# Patient Record
Sex: MALE | Race: WHITE | NOT HISPANIC OR LATINO | URBAN - METROPOLITAN AREA
[De-identification: names, ages, dates, MRNs, and addresses within clinical notes are randomized per-mention and may not be internally consistent; named-entity substitution may affect disease eponyms.]

---

## 2022-02-28 ENCOUNTER — EMERGENCY (EMERGENCY)
Facility: HOSPITAL | Age: 72
LOS: 0 days | Discharge: HOME | End: 2022-02-28
Admitting: EMERGENCY MEDICINE

## 2022-02-28 ENCOUNTER — INPATIENT (INPATIENT)
Facility: HOSPITAL | Age: 72
LOS: 0 days | Discharge: HOME | End: 2022-03-01
Attending: INTERNAL MEDICINE | Admitting: INTERNAL MEDICINE
Payer: MEDICARE

## 2022-02-28 VITALS
WEIGHT: 203.93 LBS | OXYGEN SATURATION: 100 % | HEIGHT: 70 IN | HEART RATE: 73 BPM | TEMPERATURE: 97 F | RESPIRATION RATE: 22 BRPM | DIASTOLIC BLOOD PRESSURE: 75 MMHG | SYSTOLIC BLOOD PRESSURE: 139 MMHG

## 2022-02-28 DIAGNOSIS — I48.91 UNSPECIFIED ATRIAL FIBRILLATION: ICD-10-CM

## 2022-02-28 DIAGNOSIS — Z20.822 CONTACT WITH AND (SUSPECTED) EXPOSURE TO COVID-19: ICD-10-CM

## 2022-02-28 DIAGNOSIS — R06.02 SHORTNESS OF BREATH: ICD-10-CM

## 2022-02-28 DIAGNOSIS — E78.5 HYPERLIPIDEMIA, UNSPECIFIED: ICD-10-CM

## 2022-02-28 DIAGNOSIS — R00.0 TACHYCARDIA, UNSPECIFIED: ICD-10-CM

## 2022-02-28 DIAGNOSIS — E07.89 OTHER SPECIFIED DISORDERS OF THYROID: ICD-10-CM

## 2022-02-28 DIAGNOSIS — I48.92 UNSPECIFIED ATRIAL FLUTTER: ICD-10-CM

## 2022-02-28 DIAGNOSIS — E78.00 PURE HYPERCHOLESTEROLEMIA, UNSPECIFIED: ICD-10-CM

## 2022-02-28 LAB
ALBUMIN SERPL ELPH-MCNC: 4.2 G/DL — SIGNIFICANT CHANGE UP (ref 3.5–5.2)
ALP SERPL-CCNC: 79 U/L — SIGNIFICANT CHANGE UP (ref 30–115)
ALT FLD-CCNC: 27 U/L — SIGNIFICANT CHANGE UP (ref 0–41)
ANION GAP SERPL CALC-SCNC: 10 MMOL/L — SIGNIFICANT CHANGE UP (ref 7–14)
APPEARANCE UR: CLEAR — SIGNIFICANT CHANGE UP
AST SERPL-CCNC: 18 U/L — SIGNIFICANT CHANGE UP (ref 0–41)
BACTERIA # UR AUTO: ABNORMAL
BASE EXCESS BLDV CALC-SCNC: -0.4 MMOL/L — SIGNIFICANT CHANGE UP (ref -2–3)
BASOPHILS # BLD AUTO: 0.02 K/UL — SIGNIFICANT CHANGE UP (ref 0–0.2)
BASOPHILS NFR BLD AUTO: 0.3 % — SIGNIFICANT CHANGE UP (ref 0–1)
BILIRUB SERPL-MCNC: 0.4 MG/DL — SIGNIFICANT CHANGE UP (ref 0.2–1.2)
BILIRUB UR-MCNC: NEGATIVE — SIGNIFICANT CHANGE UP
BUN SERPL-MCNC: 22 MG/DL — HIGH (ref 10–20)
CA-I SERPL-SCNC: 1.27 MMOL/L — SIGNIFICANT CHANGE UP (ref 1.15–1.33)
CALCIUM SERPL-MCNC: 9.9 MG/DL — SIGNIFICANT CHANGE UP (ref 8.5–10.1)
CHLORIDE SERPL-SCNC: 106 MMOL/L — SIGNIFICANT CHANGE UP (ref 98–110)
CO2 SERPL-SCNC: 24 MMOL/L — SIGNIFICANT CHANGE UP (ref 17–32)
COLOR SPEC: YELLOW — SIGNIFICANT CHANGE UP
CREAT SERPL-MCNC: 1 MG/DL — SIGNIFICANT CHANGE UP (ref 0.7–1.5)
DIFF PNL FLD: NEGATIVE — SIGNIFICANT CHANGE UP
EGFR: 81 ML/MIN/1.73M2 — SIGNIFICANT CHANGE UP
EOSINOPHIL # BLD AUTO: 0.04 K/UL — SIGNIFICANT CHANGE UP (ref 0–0.7)
EOSINOPHIL NFR BLD AUTO: 0.6 % — SIGNIFICANT CHANGE UP (ref 0–8)
GAS PNL BLDV: 139 MMOL/L — SIGNIFICANT CHANGE UP (ref 136–145)
GAS PNL BLDV: SIGNIFICANT CHANGE UP
GLUCOSE SERPL-MCNC: 118 MG/DL — HIGH (ref 70–99)
GLUCOSE UR QL: NEGATIVE MG/DL — SIGNIFICANT CHANGE UP
HCO3 BLDV-SCNC: 24 MMOL/L — SIGNIFICANT CHANGE UP (ref 22–29)
HCT VFR BLD CALC: 39.2 % — LOW (ref 42–52)
HCT VFR BLDA CALC: 40 % — SIGNIFICANT CHANGE UP (ref 39–51)
HGB BLD CALC-MCNC: 13.4 G/DL — SIGNIFICANT CHANGE UP (ref 12.6–17.4)
HGB BLD-MCNC: 13.2 G/DL — LOW (ref 14–18)
IMM GRANULOCYTES NFR BLD AUTO: 0.2 % — SIGNIFICANT CHANGE UP (ref 0.1–0.3)
KETONES UR-MCNC: NEGATIVE — SIGNIFICANT CHANGE UP
LACTATE BLDV-MCNC: 1.2 MMOL/L — SIGNIFICANT CHANGE UP (ref 0.5–2)
LEUKOCYTE ESTERASE UR-ACNC: ABNORMAL
LYMPHOCYTES # BLD AUTO: 1 K/UL — LOW (ref 1.2–3.4)
LYMPHOCYTES # BLD AUTO: 15.8 % — LOW (ref 20.5–51.1)
MCHC RBC-ENTMCNC: 27.8 PG — SIGNIFICANT CHANGE UP (ref 27–31)
MCHC RBC-ENTMCNC: 33.7 G/DL — SIGNIFICANT CHANGE UP (ref 32–37)
MCV RBC AUTO: 82.7 FL — SIGNIFICANT CHANGE UP (ref 80–94)
MONOCYTES # BLD AUTO: 0.54 K/UL — SIGNIFICANT CHANGE UP (ref 0.1–0.6)
MONOCYTES NFR BLD AUTO: 8.5 % — SIGNIFICANT CHANGE UP (ref 1.7–9.3)
NEUTROPHILS # BLD AUTO: 4.73 K/UL — SIGNIFICANT CHANGE UP (ref 1.4–6.5)
NEUTROPHILS NFR BLD AUTO: 74.6 % — SIGNIFICANT CHANGE UP (ref 42.2–75.2)
NITRITE UR-MCNC: NEGATIVE — SIGNIFICANT CHANGE UP
NRBC # BLD: 0 /100 WBCS — SIGNIFICANT CHANGE UP (ref 0–0)
NT-PROBNP SERPL-SCNC: 375 PG/ML — HIGH (ref 0–300)
PCO2 BLDV: 38 MMHG — LOW (ref 42–55)
PH BLDV: 7.41 — SIGNIFICANT CHANGE UP (ref 7.32–7.43)
PH UR: 6 — SIGNIFICANT CHANGE UP (ref 5–8)
PLATELET # BLD AUTO: 216 K/UL — SIGNIFICANT CHANGE UP (ref 130–400)
PO2 BLDV: 62 MMHG — SIGNIFICANT CHANGE UP
POTASSIUM BLDV-SCNC: 4.2 MMOL/L — SIGNIFICANT CHANGE UP (ref 3.5–5.1)
POTASSIUM SERPL-MCNC: 4.3 MMOL/L — SIGNIFICANT CHANGE UP (ref 3.5–5)
POTASSIUM SERPL-SCNC: 4.3 MMOL/L — SIGNIFICANT CHANGE UP (ref 3.5–5)
PROT SERPL-MCNC: 6.8 G/DL — SIGNIFICANT CHANGE UP (ref 6–8)
PROT UR-MCNC: NEGATIVE MG/DL — SIGNIFICANT CHANGE UP
RBC # BLD: 4.74 M/UL — SIGNIFICANT CHANGE UP (ref 4.7–6.1)
RBC # FLD: 13 % — SIGNIFICANT CHANGE UP (ref 11.5–14.5)
SAO2 % BLDV: 90.8 % — SIGNIFICANT CHANGE UP
SARS-COV-2 RNA SPEC QL NAA+PROBE: SIGNIFICANT CHANGE UP
SODIUM SERPL-SCNC: 140 MMOL/L — SIGNIFICANT CHANGE UP (ref 135–146)
SP GR SPEC: 1.01 — SIGNIFICANT CHANGE UP (ref 1.01–1.03)
TROPONIN T SERPL-MCNC: <0.01 NG/ML — SIGNIFICANT CHANGE UP
UROBILINOGEN FLD QL: 0.2 MG/DL — SIGNIFICANT CHANGE UP
WBC # BLD: 6.34 K/UL — SIGNIFICANT CHANGE UP (ref 4.8–10.8)
WBC # FLD AUTO: 6.34 K/UL — SIGNIFICANT CHANGE UP (ref 4.8–10.8)
WBC UR QL: SIGNIFICANT CHANGE UP /HPF

## 2022-02-28 PROCEDURE — 99291 CRITICAL CARE FIRST HOUR: CPT

## 2022-02-28 PROCEDURE — 71045 X-RAY EXAM CHEST 1 VIEW: CPT | Mod: 26

## 2022-02-28 PROCEDURE — 93010 ELECTROCARDIOGRAM REPORT: CPT

## 2022-02-28 PROCEDURE — 99285 EMERGENCY DEPT VISIT HI MDM: CPT

## 2022-02-28 RX ORDER — CHLORHEXIDINE GLUCONATE 213 G/1000ML
1 SOLUTION TOPICAL DAILY
Refills: 0 | Status: DISCONTINUED | OUTPATIENT
Start: 2022-02-28 | End: 2022-03-01

## 2022-02-28 RX ORDER — DILTIAZEM HCL 120 MG
20 CAPSULE, EXT RELEASE 24 HR ORAL ONCE
Refills: 0 | Status: COMPLETED | OUTPATIENT
Start: 2022-02-28 | End: 2022-02-28

## 2022-02-28 RX ORDER — ATORVASTATIN CALCIUM 80 MG/1
10 TABLET, FILM COATED ORAL AT BEDTIME
Refills: 0 | Status: DISCONTINUED | OUTPATIENT
Start: 2022-02-28 | End: 2022-03-01

## 2022-02-28 RX ORDER — PROCAINAMIDE HCL 500 MG
300 TABLET, EXTENDED RELEASE ORAL ONCE
Refills: 0 | Status: COMPLETED | OUTPATIENT
Start: 2022-02-28 | End: 2022-02-28

## 2022-02-28 RX ORDER — PROCAINAMIDE HCL 500 MG
2 TABLET, EXTENDED RELEASE ORAL
Qty: 2000 | Refills: 0 | Status: DISCONTINUED | OUTPATIENT
Start: 2022-02-28 | End: 2022-03-01

## 2022-02-28 RX ORDER — DILTIAZEM HCL 120 MG
30 CAPSULE, EXT RELEASE 24 HR ORAL ONCE
Refills: 0 | Status: COMPLETED | OUTPATIENT
Start: 2022-02-28 | End: 2022-02-28

## 2022-02-28 RX ORDER — SODIUM CHLORIDE 9 MG/ML
1000 INJECTION INTRAMUSCULAR; INTRAVENOUS; SUBCUTANEOUS
Refills: 0 | Status: DISCONTINUED | OUTPATIENT
Start: 2022-02-28 | End: 2022-03-01

## 2022-02-28 RX ORDER — DILTIAZEM HCL 120 MG
23 CAPSULE, EXT RELEASE 24 HR ORAL ONCE
Refills: 0 | Status: DISCONTINUED | OUTPATIENT
Start: 2022-02-28 | End: 2022-02-28

## 2022-02-28 RX ORDER — SODIUM CHLORIDE 9 MG/ML
1000 INJECTION, SOLUTION INTRAVENOUS ONCE
Refills: 0 | Status: COMPLETED | OUTPATIENT
Start: 2022-02-28 | End: 2022-02-28

## 2022-02-28 RX ADMIN — SODIUM CHLORIDE 1000 MILLILITER(S): 9 INJECTION, SOLUTION INTRAVENOUS at 13:08

## 2022-02-28 RX ADMIN — Medication 30 MG/MIN: at 17:44

## 2022-02-28 RX ADMIN — Medication 20 MILLIGRAM(S): at 14:17

## 2022-02-28 RX ADMIN — Medication 30 MG/MIN: at 21:49

## 2022-02-28 RX ADMIN — Medication 30 MILLIGRAM(S): at 14:45

## 2022-02-28 RX ADMIN — Medication 506 MILLIGRAM(S): at 16:36

## 2022-02-28 RX ADMIN — Medication 30 MG/MIN: at 17:38

## 2022-02-28 NOTE — ED PROVIDER NOTE - NS ED ROS FT
Eyes:  No visual changes, eye pain or discharge.  ENMT:  No hearing changes, pain, discharge or infections. No neck pain or stiffness.  Cardiac:  No chest pain, +SOB , no edema. No chest pain with exertion.  Respiratory:  No cough. No hemoptysis.   GI:  No nausea, vomiting, diarrhea or abdominal pain.  :  No dysuria, frequency or burning.  MS:  No myalgia, muscle weakness, joint pain or back pain.  Neuro:  No headache or weakness.    Skin:  No skin rash.   Endocrine: No history of thyroid disease or diabetes.

## 2022-02-28 NOTE — H&P ADULT - HISTORY OF PRESENT ILLNESS
70 yo male PMHx sig for HLD, pt reported palpitations when climbing stairs + sob, upon eval pt found to be in a-flutter w rapid rate, treated w IV cardizem that lowered the rate but rhythm then found to be a-fib. As per cardio, pt to be started on procainimide to convert to sinus rhythm.

## 2022-02-28 NOTE — ED PROVIDER NOTE - OBJECTIVE STATEMENT
The patient is a 71 year old male with a history of HLD presenting for shortness of breath on exertion that began today. States he had some right sided chest soreness last night which resolved on its own. No fevers/chills, cough. Denies hemoptysis, history of blood clots, recent surgery/trauma, recent travel, hormone use, leg swelling.

## 2022-02-28 NOTE — H&P ADULT - NSHPLABSRESULTS_GEN_ALL_CORE
13.2   6.34  )-----------( 216      ( 2022 13:00 )             39.2           140  |  106  |  22<H>  ----------------------------<  118<H>  4.3   |  24  |  1.0    Ca    9.9      2022 13:00    TPro  6.8  /  Alb  4.2  /  TBili  0.4  /  DBili  x   /  AST  18  /  ALT  27  /  AlkPhos  79                Urinalysis Basic - ( 2022 13:30 )    Color: Yellow / Appearance: Clear / S.015 / pH: x  Gluc: x / Ketone: Negative  / Bili: Negative / Urobili: 0.2 mg/dL   Blood: x / Protein: Negative mg/dL / Nitrite: Negative   Leuk Esterase: Trace / RBC: x / WBC 1-2 /HPF   Sq Epi: x / Non Sq Epi: x / Bacteria: Few            Lactate Trend      CARDIAC MARKERS ( 2022 13:00 )  x     / <0.01 ng/mL / x     / x     / x            CAPILLARY BLOOD GLUCOSE

## 2022-02-28 NOTE — PATIENT PROFILE ADULT - FALL HARM RISK - RISK INTERVENTIONS

## 2022-02-28 NOTE — ED PROVIDER NOTE - PROGRESS NOTE DETAILS
TA: Spoke with Dr. Martinez; stated patient can be started on procainamide to convert afib to NSR since symptoms began last night. Pt approved for CCU tele.

## 2022-02-28 NOTE — ED PROVIDER NOTE - PHYSICAL EXAMINATION
CONSTITUTIONAL: Well-developed; well-nourished; in no acute distress.   SKIN: warm, dry  HEAD: Normocephalic; atraumatic.  EYES: no conjunctival injection.  ENT: No nasal discharge; airway clear.  NECK: Supple; non tender.  CARD: Irregular rhythm +tachycardic. no JVD.   RESP: No wheezes, rales or rhonchi.  ABD: soft ntnd.   EXT: Normal ROM. No clubbing, cyanosis or edema.   LYMPH: No acute cervical adenopathy.  NEURO: Alert, oriented, grossly unremarkable.  PSYCH: Cooperative, appropriate.

## 2022-02-28 NOTE — H&P ADULT - NSHPPHYSICALEXAM_GEN_ALL_CORE
no distress, awake and responsive  eyes: PERRLA, EOMI  o/p: moist mucoas good dentition  neck: -jvd/benton  lungs: clear b/ll -r/r/w  heart: s1s2 tachy regular  abd: soft, nt, nd  ext:; -c/c/e  skin: intact, no lesions noted  neuro: no focal deficits

## 2022-02-28 NOTE — ED PROVIDER NOTE - ATTENDING CONTRIBUTION TO CARE
71 y.o. male, PMH of high cholesterol, comes in c/o SOB on exertion which started today. Reports chest soreness overnight but it resolved now. No leg pain or swelling. No cough. No fever/chills. No travel. No symptoms at rest. Denies nausea, vomiting, diarrhea, constipation, urinary symptoms, syncope/near syncope, cough/URI symptoms, headache, weakness, numbness, focal deficits, visual changes, gait or balance changes, dizziness. On exam, pt in NAD, AAOx3, head NC/AT, CN II-XII intact, lungs CTA B/L, CV S1S2 regular, abdomen soft/NT/ND/(+)BS, ext (-) edema, motor 5/5x4, sensation intact. WIll do labs, CXR, EKG and reevaluate.

## 2022-02-28 NOTE — H&P ADULT - ASSESSMENT
atrial fib/flutter  dyspnea      Pt w sudden onset of palpitations and sob. Will check serial CE, and ekg, obtain 2d echo.  Cardio eval/f/u.  Continue procainimide drip as per cardio.  DVT prophylaxis, will hold full AC at this time. admit to CCU tele.  Check TFT's.

## 2022-02-28 NOTE — ED PROVIDER NOTE - CLINICAL SUMMARY MEDICAL DECISION MAKING FREE TEXT BOX
71 y.o. male, PMH of high cholesterol, comes in c/o SOB on exertion which started today. Reports chest soreness overnight but it resolved now. No leg pain or swelling. No cough. No fever/chills. No travel. No symptoms at rest. Denies nausea, vomiting, diarrhea, constipation, urinary symptoms, syncope/near syncope, cough/URI symptoms, headache, weakness, numbness, focal deficits, visual changes, gait or balance changes, dizziness. On exam, pt in NAD, AAOx3, head NC/AT, CN II-XII intact, lungs CTA B/L, CV S1S2 regular, abdomen soft/NT/ND/(+)BS, ext (-) edema, motor 5/5x4, sensation intact. Pt found to be in new onset a.flutter/a.fib. Rate controlled with cardizem. WIll admit.

## 2022-03-01 VITALS
HEART RATE: 61 BPM | DIASTOLIC BLOOD PRESSURE: 74 MMHG | RESPIRATION RATE: 19 BRPM | SYSTOLIC BLOOD PRESSURE: 127 MMHG | OXYGEN SATURATION: 97 %

## 2022-03-01 LAB
ALBUMIN SERPL ELPH-MCNC: 3.7 G/DL — SIGNIFICANT CHANGE UP (ref 3.5–5.2)
ALP SERPL-CCNC: 64 U/L — SIGNIFICANT CHANGE UP (ref 30–115)
ALT FLD-CCNC: 22 U/L — SIGNIFICANT CHANGE UP (ref 0–41)
ANION GAP SERPL CALC-SCNC: 11 MMOL/L — SIGNIFICANT CHANGE UP (ref 7–14)
AST SERPL-CCNC: 15 U/L — SIGNIFICANT CHANGE UP (ref 0–41)
BASOPHILS # BLD AUTO: 0.04 K/UL — SIGNIFICANT CHANGE UP (ref 0–0.2)
BASOPHILS NFR BLD AUTO: 1.1 % — HIGH (ref 0–1)
BILIRUB SERPL-MCNC: 0.3 MG/DL — SIGNIFICANT CHANGE UP (ref 0.2–1.2)
BUN SERPL-MCNC: 22 MG/DL — HIGH (ref 10–20)
CALCIUM SERPL-MCNC: 8.5 MG/DL — SIGNIFICANT CHANGE UP (ref 8.5–10.1)
CHLORIDE SERPL-SCNC: 107 MMOL/L — SIGNIFICANT CHANGE UP (ref 98–110)
CO2 SERPL-SCNC: 23 MMOL/L — SIGNIFICANT CHANGE UP (ref 17–32)
CREAT SERPL-MCNC: 1 MG/DL — SIGNIFICANT CHANGE UP (ref 0.7–1.5)
D DIMER BLD IA.RAPID-MCNC: 138 NG/ML DDU — SIGNIFICANT CHANGE UP (ref 0–230)
EGFR: 81 ML/MIN/1.73M2 — SIGNIFICANT CHANGE UP
EOSINOPHIL # BLD AUTO: 0.06 K/UL — SIGNIFICANT CHANGE UP (ref 0–0.7)
EOSINOPHIL NFR BLD AUTO: 1.6 % — SIGNIFICANT CHANGE UP (ref 0–8)
GLUCOSE SERPL-MCNC: 104 MG/DL — HIGH (ref 70–99)
HCT VFR BLD CALC: 34.6 % — LOW (ref 42–52)
HCV AB S/CO SERPL IA: 0.2 COI — SIGNIFICANT CHANGE UP
HCV AB SERPL-IMP: SIGNIFICANT CHANGE UP
HGB BLD-MCNC: 11.4 G/DL — LOW (ref 14–18)
IMM GRANULOCYTES NFR BLD AUTO: 0.3 % — SIGNIFICANT CHANGE UP (ref 0.1–0.3)
LYMPHOCYTES # BLD AUTO: 1 K/UL — LOW (ref 1.2–3.4)
LYMPHOCYTES # BLD AUTO: 26.3 % — SIGNIFICANT CHANGE UP (ref 20.5–51.1)
MCHC RBC-ENTMCNC: 27.5 PG — SIGNIFICANT CHANGE UP (ref 27–31)
MCHC RBC-ENTMCNC: 32.9 G/DL — SIGNIFICANT CHANGE UP (ref 32–37)
MCV RBC AUTO: 83.6 FL — SIGNIFICANT CHANGE UP (ref 80–94)
MONOCYTES # BLD AUTO: 0.4 K/UL — SIGNIFICANT CHANGE UP (ref 0.1–0.6)
MONOCYTES NFR BLD AUTO: 10.5 % — HIGH (ref 1.7–9.3)
NEUTROPHILS # BLD AUTO: 2.29 K/UL — SIGNIFICANT CHANGE UP (ref 1.4–6.5)
NEUTROPHILS NFR BLD AUTO: 60.2 % — SIGNIFICANT CHANGE UP (ref 42.2–75.2)
NRBC # BLD: 0 /100 WBCS — SIGNIFICANT CHANGE UP (ref 0–0)
PLATELET # BLD AUTO: 196 K/UL — SIGNIFICANT CHANGE UP (ref 130–400)
POTASSIUM SERPL-MCNC: 4.6 MMOL/L — SIGNIFICANT CHANGE UP (ref 3.5–5)
POTASSIUM SERPL-SCNC: 4.6 MMOL/L — SIGNIFICANT CHANGE UP (ref 3.5–5)
PROT SERPL-MCNC: 5.6 G/DL — LOW (ref 6–8)
RBC # BLD: 4.14 M/UL — LOW (ref 4.7–6.1)
RBC # FLD: 13.2 % — SIGNIFICANT CHANGE UP (ref 11.5–14.5)
SODIUM SERPL-SCNC: 141 MMOL/L — SIGNIFICANT CHANGE UP (ref 135–146)
TROPONIN T SERPL-MCNC: <0.01 NG/ML — SIGNIFICANT CHANGE UP
TSH SERPL-MCNC: 3.58 UIU/ML — SIGNIFICANT CHANGE UP (ref 0.27–4.2)
WBC # BLD: 3.8 K/UL — LOW (ref 4.8–10.8)
WBC # FLD AUTO: 3.8 K/UL — LOW (ref 4.8–10.8)

## 2022-03-01 PROCEDURE — 99239 HOSP IP/OBS DSCHRG MGMT >30: CPT

## 2022-03-01 PROCEDURE — 99222 1ST HOSP IP/OBS MODERATE 55: CPT

## 2022-03-01 PROCEDURE — 93010 ELECTROCARDIOGRAM REPORT: CPT

## 2022-03-01 RX ORDER — METOPROLOL TARTRATE 50 MG
0.5 TABLET ORAL
Qty: 30 | Refills: 0
Start: 2022-03-01 | End: 2022-03-30

## 2022-03-01 RX ORDER — ASPIRIN/CALCIUM CARB/MAGNESIUM 324 MG
1 TABLET ORAL
Qty: 30 | Refills: 0
Start: 2022-03-01 | End: 2022-03-30

## 2022-03-01 RX ORDER — ATORVASTATIN CALCIUM 80 MG/1
1 TABLET, FILM COATED ORAL
Qty: 0 | Refills: 0 | DISCHARGE

## 2022-03-01 RX ORDER — ASPIRIN/CALCIUM CARB/MAGNESIUM 324 MG
81 TABLET ORAL DAILY
Refills: 0 | Status: DISCONTINUED | OUTPATIENT
Start: 2022-03-01 | End: 2022-03-01

## 2022-03-01 RX ORDER — ATORVASTATIN CALCIUM 80 MG/1
1 TABLET, FILM COATED ORAL
Qty: 30 | Refills: 0
Start: 2022-03-01 | End: 2022-03-30

## 2022-03-01 RX ORDER — ENOXAPARIN SODIUM 100 MG/ML
40 INJECTION SUBCUTANEOUS EVERY 24 HOURS
Refills: 0 | Status: DISCONTINUED | OUTPATIENT
Start: 2022-03-01 | End: 2022-03-01

## 2022-03-01 RX ORDER — METOPROLOL TARTRATE 50 MG
12.5 TABLET ORAL DAILY
Refills: 0 | Status: DISCONTINUED | OUTPATIENT
Start: 2022-03-01 | End: 2022-03-01

## 2022-03-01 RX ADMIN — ENOXAPARIN SODIUM 40 MILLIGRAM(S): 100 INJECTION SUBCUTANEOUS at 11:31

## 2022-03-01 RX ADMIN — Medication 81 MILLIGRAM(S): at 11:30

## 2022-03-01 RX ADMIN — SODIUM CHLORIDE 75 MILLILITER(S): 9 INJECTION INTRAMUSCULAR; INTRAVENOUS; SUBCUTANEOUS at 00:57

## 2022-03-01 RX ADMIN — Medication 12.5 MILLIGRAM(S): at 11:30

## 2022-03-01 RX ADMIN — ATORVASTATIN CALCIUM 10 MILLIGRAM(S): 80 TABLET, FILM COATED ORAL at 01:19

## 2022-03-01 RX ADMIN — CHLORHEXIDINE GLUCONATE 1 APPLICATION(S): 213 SOLUTION TOPICAL at 11:35

## 2022-03-01 NOTE — CONSULT NOTE ADULT - SUBJECTIVE AND OBJECTIVE BOX
CARDIOLOGY CONSULT NOTE     CHIEF COMPLAINT/REASON FOR CONSULT:    HPI:  70 yo male PMHx sig for HLD, pt reported palpitations when climbing stairs + sob, upon eval pt found to be in a-flutter w rapid rate, treated w IV cardizem that lowered the rate but rhythm then found to be a-fib. As per cardio, pt to be started on procainimide to convert to sinus rhythm. (28 Feb 2022 17:04)      PAST MEDICAL & SURGICAL HISTORY:  High cholesterol        Cardiac Risks:   [ ]HTN, [ ] DM, [ ] Smoking, [ ] FH,  [x ] Lipids        MEDICATIONS:  MEDICATIONS  (STANDING):  atorvastatin 10 milliGRAM(s) Oral at bedtime  chlorhexidine 4% Liquid 1 Application(s) Topical daily  sodium chloride 0.9%. 1000 milliLiter(s) (75 mL/Hr) IV Continuous <Continuous>      FAMILY HISTORY:      SOCIAL HISTORY:      [ ] Marital status   Allergies    No Known Allergies    Intolerances    	    REVIEW OF SYSTEMS:  CONSTITUTIONAL: No fever, weight loss, or fatigue  EYES: No eye pain, visual disturbances, or discharge  ENMT:  No difficulty hearing, tinnitus, vertigo; No sinus or throat pain  NECK: No pain or stiffness  RESPIRATORY: No cough, wheezing, chills or hemoptysis; No Shortness of Breath  CARDIOVASCULAR: No chest pain, palpitations, passing out, dizziness, or leg swelling  GASTROINTESTINAL: No abdominal or epigastric pain. No nausea, vomiting, or hematemesis; No diarrhea or constipation. No melena or hematochezia.  GENITOURINARY: No dysuria, frequency, hematuria, or incontinence  NEUROLOGICAL: No headaches, memory loss, loss of strength, numbness, or tremors  SKIN: No itching, burning, rashes, or lesions   	      PHYSICAL EXAM:  T(C): 35.9 (03-01-22 @ 07:10), Max: 36.4 (02-28-22 @ 18:51)  HR: 59 (03-01-22 @ 04:04) (59 - 133)  BP: 118/74 (03-01-22 @ 04:04) (106/69 - 139/75)  RR: 19 (03-01-22 @ 07:10) (18 - 24)  SpO2: 96% (03-01-22 @ 04:04) (96% - 100%)  Wt(kg): --  I&O's Summary    28 Feb 2022 07:01  -  01 Mar 2022 07:00  --------------------------------------------------------  IN: 760 mL / OUT: 0 mL / NET: 760 mL        Appearance: Normal	  Psychiatry: A & O x 3, Mood & affect appropriate  HEENT:   Normal oral mucosa, PERRL, EOMI	  Lymphatic: No lymphadenopathy  Cardiovascular: Normal S1 S2,RRR, No JVD, No murmurs  Respiratory: Lungs clear to auscultation	  Gastrointestinal:  Soft, Non-tender, + BS	  Skin: No rashes, No ecchymoses, No cyanosis	  Neurologic: Non-focal  Extremities: Normal range of motion, No clubbing, cyanosis or edema  Vascular: Peripheral pulses palpable 2+ bilaterally      ECG:  	< from: 12 Lead ECG (02.28.22 @ 14:24) >    Diagnosis Line Atrial fibrillation  Left axis deviation  Minimal voltage criteria for LVH, may be normal variant  Abnormal ECG    Confirmed by VIRAJ LONG MD ([id number]) on 2/28/2022 3:08:00 PM    < end of copied text >      	  LABS:	 	    CARDIAC MARKERS:          Serum Pro-Brain Natriuretic Peptide: 375 pg/mL (02-28 @ 13:00)                            11.4   3.80  )-----------( 196      ( 01 Mar 2022 05:03 )             34.6     03-01    141  |  107  |  22<H>  ----------------------------<  104<H>  4.6   |  23  |  1.0    Ca    8.5      01 Mar 2022 05:03    TPro  5.6<L>  /  Alb  3.7  /  TBili  0.3  /  DBili  x   /  AST  15  /  ALT  22  /  AlkPhos  64  03-01      proBNP: Serum Pro-Brain Natriuretic Peptide: 375 pg/mL (02-28 @ 13:00)

## 2022-03-01 NOTE — PROGRESS NOTE ADULT - SUBJECTIVE AND OBJECTIVE BOX
Patient is a 71y old  Male who presents with a chief complaint of pt reports palpitations when climbing steps (01 Mar 2022 08:19)      OVERNIGHT EVENTS:    SUBJECTIVE / INTERVAL HPI: Patient seen and examined at bedside. patient denies any chest pain, no sob, no palpitations     VITAL SIGNS:  Vital Signs Last 24 Hrs  T(C): 35.9 (01 Mar 2022 07:10), Max: 36.4 (2022 18:51)  T(F): 96.7 (01 Mar 2022 07:10), Max: 97.6 (2022 18:51)  HR: 59 (01 Mar 2022 04:04) (59 - 133)  BP: 118/74 (01 Mar 2022 04:04) (106/69 - 139/75)  BP(mean): 91 (01 Mar 2022 04:04) (91 - 91)  RR: 19 (01 Mar 2022 07:10) (18 - 24)  SpO2: 96% (01 Mar 2022 04:04) (96% - 100%)    PHYSICAL EXAM:    General: WDWN  HEENT: NC/AT; PERRL, clear conjunctiva  Neck: supple  Cardiovascular: +S1/S2; RRR, no audible murmurs   Respiratory: CTA b/l; no W/R/R  Gastrointestinal: soft, NT/ND; +BSx4  Extremities: WWP; 2+ peripheral pulses; no edema   Neurological: AAOx3; no focal deficits    MEDICATIONS:  MEDICATIONS  (STANDING):  aspirin enteric coated 81 milliGRAM(s) Oral daily  atorvastatin 10 milliGRAM(s) Oral at bedtime  chlorhexidine 4% Liquid 1 Application(s) Topical daily  enoxaparin Injectable 40 milliGRAM(s) SubCutaneous every 24 hours  metoprolol succinate ER 12.5 milliGRAM(s) Oral daily    MEDICATIONS  (PRN):      ALLERGIES:  Allergies    No Known Allergies    Intolerances        LABS:                        11.4   3.80  )-----------( 196      ( 01 Mar 2022 05:03 )             34.6     03-    141  |  107  |  22<H>  ----------------------------<  104<H>  4.6   |  23  |  1.0    Ca    8.5      01 Mar 2022 05:03    TPro  5.6<L>  /  Alb  3.7  /  TBili  0.3  /  DBili  x   /  AST  15  /  ALT  22  /  AlkPhos  64  03-01      Urinalysis Basic - ( 2022 13:30 )    Color: Yellow / Appearance: Clear / S.015 / pH: x  Gluc: x / Ketone: Negative  / Bili: Negative / Urobili: 0.2 mg/dL   Blood: x / Protein: Negative mg/dL / Nitrite: Negative   Leuk Esterase: Trace / RBC: x / WBC 1-2 /HPF   Sq Epi: x / Non Sq Epi: x / Bacteria: Few      CAPILLARY BLOOD GLUCOSE

## 2022-03-01 NOTE — CONSULT NOTE ADULT - ASSESSMENT
Patient with fh afib. Yesterday he developed palpitations ans sob walking steps. He had afib. Converted with IV Pronestyl Now nsr. SURY vasc 1. He needs a echo can do out patient Check thyroid can do out patient. MI ruled out. BNP low. Try low dose beta. ASA. No AC now. In several weeks out patient SE. Patient aware Patient aware afib may occur again
IMPRESSION:  Afib S/P successful Cardioversion with IV Pronestyl   SOB- Resolved    PLAN:    CNS: Avoid CNS depressants.      HEENT: Oral care    PULMONARY:  HOB @ 45 degrees. Aspiration Precautions . Check pulse ox on ambulation    CARDIOVASCULAR: Rate control. cardiology follow up. ECHO. D/C IVF     GI: GI prophylaxis.  Feeding as tolerated    RENAL:  Follow up lytes.  Correct as needed    INFECTIOUS DISEASE: Follow up cultures. Monitor off ABX from pulmonary standpoint    HEMATOLOGICAL:  DVT prophylaxis. AC as per cardiology . D-dimer    ENDOCRINE:  Follow up FS.  Insulin protocol if needed. TSH    MUSCULOSKELETAL: OOB to chair    [place]: No  Lines: peripheral IV  Code status: Full code  Disposition: Disposition as per cardiology and hospitalist

## 2022-03-01 NOTE — DISCHARGE NOTE PROVIDER - HOSPITAL COURSE
70 yo male PMHx sig for HLD, pt reported palpitations when climbing stairs + sob, upon eval pt found to be in a-flutter w rapid rate, treated w IV cardizem that lowered the rate but rhythm then found to be a-fib. As per cardio, pt to be started on procainimide to convert to sinus rhythm.   patient today was seen and examined. was found to be in NSR. procainamide drip stopped and patient switched to oral metoprolol

## 2022-03-01 NOTE — PROGRESS NOTE ADULT - SUBJECTIVE AND OBJECTIVE BOX
Patient feels good, ambulating in unit with no palpitations, remains in NSR      T(F): 96.7 (03-01-22 @ 07:10), Max: 97.6 (02-28-22 @ 18:51)  HR: 61 (03-01-22 @ 11:27)  BP: 127/74 (03-01-22 @ 11:27)  RR: 19 (03-01-22 @ 11:27)  SpO2: 97% (03-01-22 @ 11:27) (96% - 98%)    PHYSICAL EXAM:  GENERAL: NAD  HEAD:  Atraumatic, Normocephalic  EYES: EOMI, PERRLA, conjunctiva and sclera clear  NERVOUS SYSTEM:  Alert & Oriented X3, no focal deficits   CHEST/LUNG: Clear to percussion bilaterally; No rales, rhonchi, wheezing, or rubs  HEART: Regular rate and rhythm; No murmurs, rubs, or gallops  ABDOMEN: Soft, Nontender, Nondistended; Bowel sounds present  EXTREMITIES:  2+ Peripheral Pulses, No clubbing, cyanosis, or edema    LABS  03-01    141  |  107  |  22<H>  ----------------------------<  104<H>  4.6   |  23  |  1.0    Ca    8.5      01 Mar 2022 05:03    TPro  5.6<L>  /  Alb  3.7  /  TBili  0.3  /  DBili  x   /  AST  15  /  ALT  22  /  AlkPhos  64  03-01                          11.4   3.80  )-----------( 196      ( 01 Mar 2022 05:03 )             34.6       CARDIAC ENZYMES    Troponin T, Serum: <0.01 ng/mL (03-01-22 @ 05:03)  Troponin T, Serum: <0.01 ng/mL (02-28-22 @ 13:00)    < from: 12 Lead ECG (02.28.22 @ 12:24) >    Diagnosis Line Atrial flutter with 2 to 1 block  Left axis deviation  Abnormal ECG      < end of copied text >  < from: 12 Lead ECG (02.28.22 @ 14:24) >  Diagnosis Line Atrial fibrillation  Left axis deviation  Minimal voltage criteria for LVH, may be normal variant  Abnormal ECG    < end of copied text >  < from: 12 Lead ECG (03.01.22 @ 09:11) >  Diagnosis Line Normal sinus rhythm    < end of copied text >      RADIOLOGY  < from: Xray Chest 1 View AP/PA (02.28.22 @ 12:28) >  Impression:    No radiographic evidence of acute cardiopulmonary disease.      < end of copied text >    MEDICATIONS  (STANDING):  aspirin enteric coated 81 milliGRAM(s) Oral daily  atorvastatin 10 milliGRAM(s) Oral at bedtime  chlorhexidine 4% Liquid 1 Application(s) Topical daily  enoxaparin Injectable 40 milliGRAM(s) SubCutaneous every 24 hours  metoprolol succinate ER 12.5 milliGRAM(s) Oral daily    MEDICATIONS  (PRN):

## 2022-03-01 NOTE — DISCHARGE NOTE NURSING/CASE MANAGEMENT/SOCIAL WORK - NSDCPEFALRISK_GEN_ALL_CORE
For information on Fall & Injury Prevention, visit: [email or web address] OR  [email or web address] OR  https://www.cdc.gov/steadi/patient.html

## 2022-03-01 NOTE — PROGRESS NOTE ADULT - ASSESSMENT
IMPRESSION:  Afib S/P successful pharmacologic Cardioversion with IV procainamide   SOB- Resolved    PLAN:    CNS: Avoid CNS depressants.      HEENT: Oral care    PULMONARY:  HOB @ 45 degrees. Aspiration Precautions . Check pulse ox on ambulation    CARDIOVASCULAR: procainamide drip dc. start metoprolol 12.5 ER od. Afib with SURY Vasc 1, start aspirin. cardiology follow up. ECHO. D/C IVF     GI: GI prophylaxis.  Feeding as tolerated    RENAL:  Follow up lytes.  Correct as needed    INFECTIOUS DISEASE: Follow up cultures. Monitor off ABX from pulmonary standpoint    HEMATOLOGICAL:  DVT prophylaxis. AC as per cardiology . D-dimer    ENDOCRINE:  Follow up FS.  Insulin protocol if needed. TSH    MUSCULOSKELETAL: OOB to chair    Code status: Full code  Disposition: possible dc today     
70 yo male with a PMHx of  HLD, presented to the ED yesterday for sob and palpitations -> pt found in  a-flutter w rapid rate, treated w IV Cardizem and then IV procainamide and converted  to sinus rhythm.    paroxysmal afib     - I spoke with Cardiology   - may DC pt home on aspirin, Toprol-XL 12.5mg daily   - continue home statin   - outpt cardio f/u for ECHO / TSH and  stress test   - 32min spent on DC

## 2022-03-01 NOTE — DISCHARGE NOTE PROVIDER - CARE PROVIDER_API CALL
Romero Martinez)  Cardiovascular Disease; Internal Medicine  [address] [place] [address]  Phone: [phone]  Fax: [phone]  Established Patient  Follow Up Time: 1 week

## 2022-03-01 NOTE — DISCHARGE NOTE NURSING/CASE MANAGEMENT/SOCIAL WORK - PATIENT PORTAL LINK FT
You can access the FollowMyHealth Patient Portal offered by [facility] by registering at the following website: [email or web address]. By joining [facility]’s FollowMyHealth portal, you will also be able to view your health information using other applications (apps) compatible with our system.

## 2022-03-01 NOTE — CONSULT NOTE ADULT - ATTENDING COMMENTS
Attending Statement: I have personally performed a face to face diagnostic evaluation on this patient. The patient is suffering from:  Afib   SOB-  I have reviewed the above note and agree with the history, exam and suggestions for care, except as I have noted in the text. I have amended the treatment plans as necessary.

## 2022-03-01 NOTE — CONSULT NOTE ADULT - SUBJECTIVE AND OBJECTIVE BOX
Patient is a 71y old  Male who presents with a chief complaint of pt reports palpitations when climbing steps (01 Mar 2022 08:02)      HPI:  70 yo male PMHx sig for HLD, pt reported palpitations when climbing stairs + sob, upon eval pt found to be in a-flutter w rapid rate, treated w IV cardizem that lowered the rate but rhythm then found to be a-fib. As per cardio, pt to be started on procainimide to convert to sinus rhythm. (2022 17:04)      PAST MEDICAL & SURGICAL HISTORY:  High cholesterol        SOCIAL HX:   Smoking  denies                       ETOH    denies                           FAMILY HISTORY:  :  No known cardiovacular family hisotry     Review Of Systems:     All ROS are negative except per HPI       Allergies    No Known Allergies    Intolerances          PHYSICAL EXAM    ICU Vital Signs Last 24 Hrs  T(C): 35.9 (01 Mar 2022 07:10), Max: 36.4 (2022 18:51)  T(F): 96.7 (01 Mar 2022 07:10), Max: 97.6 (2022 18:51)  HR: 59 (01 Mar 2022 04:04) (59 - 133)  BP: 118/74 (01 Mar 2022 04:04) (106/69 - 139/75)  BP(mean): 91 (01 Mar 2022 04:04) (91 - 91)  ABP: --  ABP(mean): --  RR: 19 (01 Mar 2022 07:10) (18 - 24)  SpO2: 96% (01 Mar 2022 04:04) (96% - 100%)      CONSTITUTIONAL:  Well nourished.   NAD    ENT:   Airway patent,   Mouth with normal mucosa.   No thrush      CARDIAC:   Normal rate,   Regular rhythm.    No edema      Vascular:   normal systolic impulse  no bruits    RESPIRATORY:   No wheezing  Bilateral BS   Not tachypneic,  No use of accessory muscles    GASTROINTESTINAL:  Abdomen soft,   Non-tender,   No guarding,   + BS      NEUROLOGICAL:   Alert and awake  Non focal    SKIN:   Skin normal color for race,   No evidence of rash.              22 @ 07:01  -  - @ 07:00  --------------------------------------------------------  IN:    Oral Fluid: 100 mL    Procainamide: 210 mL    sodium chloride 0.9%: 450 mL  Total IN: 760 mL    OUT:  Total OUT: 0 mL    Total NET: 760 mL          LABS:                          11.4   3.80  )-----------( 196      ( 01 Mar 2022 05:03 )             34.6                                               03-    141  |  107  |  22<H>  ----------------------------<  104<H>  4.6   |  23  |  1.0    Ca    8.5      01 Mar 2022 05:03    TPro  5.6<L>  /  Alb  3.7  /  TBili  0.3  /  DBili  x   /  AST  15  /  ALT  22  /  AlkPhos  64  03-01                                             Urinalysis Basic - ( 2022 13:30 )    Color: Yellow / Appearance: Clear / S.015 / pH: x  Gluc: x / Ketone: Negative  / Bili: Negative / Urobili: 0.2 mg/dL   Blood: x / Protein: Negative mg/dL / Nitrite: Negative   Leuk Esterase: Trace / RBC: x / WBC 1-2 /HPF   Sq Epi: x / Non Sq Epi: x / Bacteria: Few        CARDIAC MARKERS ( 01 Mar 2022 05:03 )  x     / <0.01 ng/mL / x     / x     / x      CARDIAC MARKERS ( 2022 13:00 )  x     / <0.01 ng/mL / x     / x     / x                                                LIVER FUNCTIONS - ( 01 Mar 2022 05:03 )  Alb: 3.7 g/dL / Pro: 5.6 g/dL / ALK PHOS: 64 U/L / ALT: 22 U/L / AST: 15 U/L / GGT: x                                                                                                                                       X-Rays reviewed           < from: Xray Chest 1 View AP/PA (22 @ 12:28) >    No radiographic evidence of acute cardiopulmonary disease.    < end of copied text >                                                                                 MEDICATIONS  (STANDING):  atorvastatin 10 milliGRAM(s) Oral at bedtime  chlorhexidine 4% Liquid 1 Application(s) Topical daily  metoprolol succinate ER 12.5 milliGRAM(s) Oral daily  sodium chloride 0.9%. 1000 milliLiter(s) (75 mL/Hr) IV Continuous <Continuous>    MEDICATIONS  (PRN):

## 2022-03-01 NOTE — DISCHARGE NOTE PROVIDER - NSDCCPCAREPLAN_GEN_ALL_CORE_FT
PRINCIPAL DISCHARGE DIAGNOSIS  Diagnosis: Atrial fibrillation and flutter  Assessment and Plan of Treatment: you were diagnosed to have atrial fibrillation with rapid ventricular response  you were treated with IV procainamide then your heart rhythm reverted back to normal sinus rhythm   please follow up with your cardiologist as outpatient   do not hesitate to come back to the ER if you have any palpitations, chest pain or shortness of breath   please keep taking your medications as prescribed by your physician       PRINCIPAL DISCHARGE DIAGNOSIS  Diagnosis: Atrial fibrillation and flutter  Assessment and Plan of Treatment: you were diagnosed to have atrial fibrillation with rapid ventricular response  you were treated with IV procainamide then your heart rhythm reverted back to normal sinus rhythm   please follow up with your cardiologist as outpatient   do not hesitate to come back to the ER if you have any palpitations, chest pain or shortness of breath   please keep taking your medications as prescribed by your physician  -TSH and TTE outpatient  - please follow up with your PCP to get above testing done

## 2022-03-01 NOTE — DISCHARGE NOTE PROVIDER - NSDCMRMEDTOKEN_GEN_ALL_CORE_FT
Aspirin Enteric Coated 81 mg oral delayed release tablet: 1 tab(s) orally once a day  atorvastatin 10 mg oral tablet: 1 tab(s) orally once a day  Metoprolol Succinate ER 25 mg oral tablet, extended release: 0.5 tab(s) orally once a day
